# Patient Record
Sex: FEMALE | Race: WHITE | Employment: OTHER | URBAN - METROPOLITAN AREA
[De-identification: names, ages, dates, MRNs, and addresses within clinical notes are randomized per-mention and may not be internally consistent; named-entity substitution may affect disease eponyms.]

---

## 2017-05-31 ENCOUNTER — HOSPITAL ENCOUNTER (OUTPATIENT)
Dept: NUCLEAR MEDICINE | Age: 57
Discharge: HOME | End: 2017-05-31
Attending: UROLOGY
Payer: COMMERCIAL

## 2017-05-31 DIAGNOSIS — N28.9 URETERAL DISORDER: ICD-10-CM

## 2017-05-31 PROCEDURE — 78708 K FLOW/FUNCT IMAGE W/DRUG: CPT

## 2017-05-31 PROCEDURE — 74011250636 HC RX REV CODE- 250/636: Performed by: UROLOGY

## 2017-05-31 RX ORDER — FUROSEMIDE 10 MG/ML
20 INJECTION INTRAMUSCULAR; INTRAVENOUS
Status: COMPLETED | OUTPATIENT
Start: 2017-05-31 | End: 2017-05-31

## 2017-05-31 RX ORDER — SODIUM CHLORIDE 0.9 % (FLUSH) 0.9 %
SYRINGE (ML) INJECTION
Status: COMPLETED
Start: 2017-05-31 | End: 2017-05-31

## 2017-05-31 RX ADMIN — FUROSEMIDE 20 MG: 10 INJECTION, SOLUTION INTRAMUSCULAR; INTRAVENOUS at 10:28

## 2017-05-31 RX ADMIN — Medication 10 ML: at 10:25

## 2021-03-04 ENCOUNTER — APPOINTMENT (OUTPATIENT)
Dept: GENERAL RADIOLOGY | Age: 61
End: 2021-03-04
Attending: EMERGENCY MEDICINE
Payer: COMMERCIAL

## 2021-03-04 ENCOUNTER — HOSPITAL ENCOUNTER (EMERGENCY)
Age: 61
Discharge: HOME | End: 2021-03-04
Attending: EMERGENCY MEDICINE | Admitting: EMERGENCY MEDICINE
Payer: COMMERCIAL

## 2021-03-04 VITALS
HEART RATE: 64 BPM | TEMPERATURE: 98.6 F | SYSTOLIC BLOOD PRESSURE: 163 MMHG | DIASTOLIC BLOOD PRESSURE: 105 MMHG | RESPIRATION RATE: 16 BRPM | OXYGEN SATURATION: 99 %

## 2021-03-04 DIAGNOSIS — S52.502A CLOSED FRACTURE OF DISTAL END OF LEFT RADIUS, UNSPECIFIED FRACTURE MORPHOLOGY, INITIAL ENCOUNTER: Primary | ICD-10-CM

## 2021-03-04 DIAGNOSIS — S52.502A CLOSED TRAUMATIC DISPLACED FRACTURE OF DISTAL END OF LEFT RADIUS, INITIAL ENCOUNTER: ICD-10-CM

## 2021-03-04 PROCEDURE — 73110 X-RAY EXAM OF WRIST: CPT

## 2021-03-04 PROCEDURE — 74011000250 HC RX REV CODE- 250: Performed by: EMERGENCY MEDICINE

## 2021-03-04 PROCEDURE — 99283 EMERGENCY DEPT VISIT LOW MDM: CPT

## 2021-03-04 PROCEDURE — 74011250637 HC RX REV CODE- 250/637: Performed by: EMERGENCY MEDICINE

## 2021-03-04 PROCEDURE — 73100 X-RAY EXAM OF WRIST: CPT

## 2021-03-04 PROCEDURE — 75810000302 HC ER LEVEL 2 CLOSED TREATMNT FRACTURE/DISLOCATION

## 2021-03-04 RX ORDER — KETOROLAC TROMETHAMINE 10 MG/1
10 TABLET, FILM COATED ORAL
Qty: 20 TAB | Refills: 0 | Status: SHIPPED | OUTPATIENT
Start: 2021-03-04 | End: 2021-03-09

## 2021-03-04 RX ORDER — OXYCODONE AND ACETAMINOPHEN 5; 325 MG/1; MG/1
1 TABLET ORAL
Qty: 12 TAB | Refills: 0 | Status: SHIPPED | OUTPATIENT
Start: 2021-03-04 | End: 2021-03-07

## 2021-03-04 RX ORDER — LIDOCAINE HYDROCHLORIDE AND EPINEPHRINE 10; 10 MG/ML; UG/ML
1.5 INJECTION, SOLUTION INFILTRATION; PERINEURAL
Status: COMPLETED | OUTPATIENT
Start: 2021-03-04 | End: 2021-03-04

## 2021-03-04 RX ORDER — OXYCODONE AND ACETAMINOPHEN 5; 325 MG/1; MG/1
1 TABLET ORAL
Status: COMPLETED | OUTPATIENT
Start: 2021-03-04 | End: 2021-03-04

## 2021-03-04 RX ORDER — IBUPROFEN 600 MG/1
600 TABLET ORAL
Qty: 20 TAB | Refills: 0 | Status: SHIPPED | OUTPATIENT
Start: 2021-03-04

## 2021-03-04 RX ADMIN — OXYCODONE HYDROCHLORIDE AND ACETAMINOPHEN 1 TABLET: 5; 325 TABLET ORAL at 16:45

## 2021-03-04 RX ADMIN — LIDOCAINE HYDROCHLORIDE AND EPINEPHRINE 15 MG: 10; 10 INJECTION, SOLUTION INFILTRATION; PERINEURAL at 17:43
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
## 2021-03-04 NOTE — PROCEDURES
Fracture Reduction Procedural Note      Preprocedural Diagnosis: Closed traumatic displaced fracture of distal end of left radius   Postprocedural Diagnosis: Closed traumatic displaced fracture of distal end of left radius      Provider: AL Elizondo     Anesthesia: Fracture/Hematoma block    Allergy: No Known Allergies    Procedure Details: The risks,benefits and alternatives of a fracture reduction were explained and consent was obtained for the procedure. The left wrist fracture hematoma was infiltrated with 10mL of 1% lidocaine. The arm was then placed in finger traps with 5 lbs of longitudinal traction. The alignment was noted to be improved with simple traction. The block appeared to be working well at that point. Closed reduction of left wrist was then performed by re-creating the fracture then using traction and correction of the deformity. Pt. Tolerated procedure well w/o complications. Sugar-tong splint placed. Pt. And Family educated on neurovascular compromise and compartment syndrome. Pt. Neurovascularly intact with sensation intact and capillary refill <2secs p procedure in left hand. Complications:  None; patient tolerated the procedure well. Pain meds per ED team.  Post-Reduction Films Pending  F/u with Dr. Mitul Silva within 3-4 days by calling for an appointment at: [address] [place] [address]  Dr. Kimber Sanches aware and agree with above plan.            Signed By: Shyann Arreola, [address]  3/4/21  6:47pm

## 2021-09-13 ENCOUNTER — HOSPITAL ENCOUNTER (EMERGENCY)
Age: 61
Discharge: HOME | End: 2021-09-13
Attending: EMERGENCY MEDICINE | Admitting: EMERGENCY MEDICINE
Payer: COMMERCIAL

## 2021-09-13 VITALS
DIASTOLIC BLOOD PRESSURE: 75 MMHG | OXYGEN SATURATION: 100 % | TEMPERATURE: 98.3 F | RESPIRATION RATE: 18 BRPM | HEIGHT: 60 IN | SYSTOLIC BLOOD PRESSURE: 155 MMHG | BODY MASS INDEX: 20.03 KG/M2 | WEIGHT: 102 LBS | HEART RATE: 55 BPM
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
## 2021-09-13 LAB
ALBUMIN SERPL-MCNC: 3.7 G/DL (ref 3.5–5)
ALBUMIN/GLOB SERPL: 1.1 {RATIO} (ref 1.1–2.2)
ALP SERPL-CCNC: 80 U/L (ref 45–117)
ALT SERPL-CCNC: 22 U/L (ref 12–78)
ANION GAP SERPL CALC-SCNC: 4 MMOL/L (ref 5–15)
APPEARANCE UR: CLEAR
AST SERPL-CCNC: 19 U/L (ref 15–37)
BACTERIA URNS QL MICRO: NEGATIVE /HPF
BASOPHILS # BLD: 0.1 K/UL (ref 0–0.1)
BASOPHILS NFR BLD: 1 % (ref 0–1)
BILIRUB SERPL-MCNC: 0.4 MG/DL (ref 0.2–1)
BILIRUB UR QL: NEGATIVE
BUN SERPL-MCNC: 16 MG/DL (ref 6–20)
BUN/CREAT SERPL: 20 (ref 12–20)
CALCIUM SERPL-MCNC: 8.8 MG/DL (ref 8.5–10.1)
CHLORIDE SERPL-SCNC: 108 MMOL/L (ref 97–108)
CK SERPL-CCNC: 66 U/L (ref 26–192)
CO2 SERPL-SCNC: 28 MMOL/L (ref 21–32)
COLOR UR: ABNORMAL
COMMENT, HOLDF: NORMAL
CREAT SERPL-MCNC: 0.79 MG/DL (ref 0.55–1.02)
DIFFERENTIAL METHOD BLD: ABNORMAL
EOSINOPHIL # BLD: 0.1 K/UL (ref 0–0.4)
EOSINOPHIL NFR BLD: 2 % (ref 0–7)
EPITH CASTS URNS QL MICRO: ABNORMAL /LPF
ERYTHROCYTE [DISTWIDTH] IN BLOOD BY AUTOMATED COUNT: 11.5 % (ref 11.5–14.5)
FIBRINOGEN PPP-MCNC: 213 MG/DL (ref 200–475)
GLOBULIN SER CALC-MCNC: 3.4 G/DL (ref 2–4)
GLUCOSE SERPL-MCNC: 92 MG/DL (ref 65–100)
GLUCOSE UR STRIP.AUTO-MCNC: NEGATIVE MG/DL
HCT VFR BLD AUTO: 39.1 % (ref 35–47)
HGB BLD-MCNC: 13 G/DL (ref 11.5–16)
HGB UR QL STRIP: NEGATIVE
HYALINE CASTS URNS QL MICRO: ABNORMAL /LPF (ref 0–5)
IMM GRANULOCYTES # BLD AUTO: 0 K/UL (ref 0–0.04)
IMM GRANULOCYTES NFR BLD AUTO: 0 % (ref 0–0.5)
INR PPP: 1.1 (ref 0.9–1.1)
KETONES UR QL STRIP.AUTO: NEGATIVE MG/DL
LEUKOCYTE ESTERASE UR QL STRIP.AUTO: NEGATIVE
LYMPHOCYTES # BLD: 1.7 K/UL (ref 0.8–3.5)
LYMPHOCYTES NFR BLD: 33 % (ref 12–49)
MAGNESIUM SERPL-MCNC: 1.8 MG/DL (ref 1.6–2.4)
MCH RBC QN AUTO: 34 PG (ref 26–34)
MCHC RBC AUTO-ENTMCNC: 33.2 G/DL (ref 30–36.5)
MCV RBC AUTO: 102.4 FL (ref 80–99)
MONOCYTES # BLD: 0.3 K/UL (ref 0–1)
MONOCYTES NFR BLD: 6 % (ref 5–13)
NEUTS SEG # BLD: 3 K/UL (ref 1.8–8)
NEUTS SEG NFR BLD: 58 % (ref 32–75)
NITRITE UR QL STRIP.AUTO: NEGATIVE
NRBC # BLD: 0 K/UL (ref 0–0.01)
NRBC BLD-RTO: 0 PER 100 WBC
PH UR STRIP: 6.5 [PH] (ref 5–8)
PLATELET # BLD AUTO: 228 K/UL (ref 150–400)
PMV BLD AUTO: 11.4 FL (ref 8.9–12.9)
POTASSIUM SERPL-SCNC: 3.6 MMOL/L (ref 3.5–5.1)
PROT SERPL-MCNC: 7.1 G/DL (ref 6.4–8.2)
PROT UR STRIP-MCNC: ABNORMAL MG/DL
PROTHROMBIN TIME: 11 SEC (ref 9–11.1)
RBC # BLD AUTO: 3.82 M/UL (ref 3.8–5.2)
RBC #/AREA URNS HPF: ABNORMAL /HPF (ref 0–5)
SAMPLES BEING HELD,HOLD: NORMAL
SODIUM SERPL-SCNC: 140 MMOL/L (ref 136–145)
SP GR UR REFRACTOMETRY: 1.02 (ref 1–1.03)
UR CULT HOLD, URHOLD: NORMAL
UROBILINOGEN UR QL STRIP.AUTO: 1 EU/DL (ref 0.2–1)
WBC # BLD AUTO: 5.2 K/UL (ref 3.6–11)
WBC URNS QL MICRO: ABNORMAL /HPF (ref 0–4)

## 2021-09-13 PROCEDURE — 99284 EMERGENCY DEPT VISIT MOD MDM: CPT

## 2021-09-13 PROCEDURE — 93005 ELECTROCARDIOGRAM TRACING: CPT

## 2021-09-13 PROCEDURE — 80053 COMPREHEN METABOLIC PANEL: CPT

## 2021-09-13 PROCEDURE — 81001 URINALYSIS AUTO W/SCOPE: CPT

## 2021-09-13 PROCEDURE — 85610 PROTHROMBIN TIME: CPT

## 2021-09-13 PROCEDURE — 36415 COLL VENOUS BLD VENIPUNCTURE: CPT

## 2021-09-13 PROCEDURE — 90715 TDAP VACCINE 7 YRS/> IM: CPT | Performed by: EMERGENCY MEDICINE

## 2021-09-13 PROCEDURE — 90471 IMMUNIZATION ADMIN: CPT

## 2021-09-13 PROCEDURE — 99283 EMERGENCY DEPT VISIT LOW MDM: CPT

## 2021-09-13 PROCEDURE — 83735 ASSAY OF MAGNESIUM: CPT

## 2021-09-13 PROCEDURE — 85025 COMPLETE CBC W/AUTO DIFF WBC: CPT

## 2021-09-13 PROCEDURE — 85384 FIBRINOGEN ACTIVITY: CPT

## 2021-09-13 PROCEDURE — 74011250636 HC RX REV CODE- 250/636: Performed by: EMERGENCY MEDICINE

## 2021-09-13 PROCEDURE — 82550 ASSAY OF CK (CPK): CPT

## 2021-09-13 RX ORDER — BACITRACIN 500 UNIT/G
1 PACKET (EA) TOPICAL
Status: DISCONTINUED | OUTPATIENT
Start: 2021-09-13 | End: 2021-09-13 | Stop reason: HOSPADM

## 2021-09-13 RX ADMIN — TETANUS TOXOID, REDUCED DIPHTHERIA TOXOID AND ACELLULAR PERTUSSIS VACCINE, ADSORBED 0.5 ML: 5; 2.5; 8; 8; 2.5 SUSPENSION INTRAMUSCULAR at 10:19

## 2021-09-13 NOTE — ED NOTES
Bite on left lower leg has been cleaned and small amount of bacitracin applied, patient tolerated well.

## 2021-09-13 NOTE — ED NOTES
Patient discharged by MD. Results and discharge instructions reviewed with the patient by MD.  Patient verbalizes understanding. Patient discharged home, stable, ambulatory, in no acute distress. IV removed.

## 2021-09-13 NOTE — ED NOTES
Triage: Patient was walking her dog on the trails and was bit in the left ankle by snake. Possible copperhead. Two puncture bite wounds noted to left leg.

## 2021-09-13 NOTE — ED PROVIDER NOTES
79-year-old female with a history of CKD, hypertension, monoclonal gammopathy of unknown significance, thyroid disease and anxiety presents with a chief complaint of snakebite. [place] snake bit her on the left ankle. She has noticed some swelling and pain in the area but denies any other symptoms. Past Medical History:   Diagnosis Date    Chronic kidney disease     Hypertension     MGUS (monoclonal gammopathy of unknown significance) 2013    Psychiatric disorder     anxiety    Thyroid disease        Past Surgical History:   Procedure Laterality Date    HX  SECTION      HX UROLOGICAL      CYSTO    HX UROLOGICAL      cysto w/stent         Family History:   Problem Relation Age of Onset    Heart Surgery Mother     Hypertension Mother     Heart Disease Mother     Hypertension Father    [facility] Anesth Problems Neg Hx        Social History     Socioeconomic History    Marital status:      Spouse name: Not on file    Number of children: Not on file    Years of education: Not on file    Highest education level: Not on file   Occupational History    Not on file   Tobacco Use    Smoking status: Never Smoker    Smokeless tobacco: Never Used   Substance and Sexual Activity    Alcohol use: Yes     Comment: WINE 3X WEEKLY    Drug use: Not on file    Sexual activity: Not on file   Other Topics Concern    Not on file   Social History Narrative    Not on file     Social Determinants of Health     Financial Resource Strain:     Difficulty of Paying Living Expenses:    Food Insecurity:     Worried About Running Out of Food in the Last Year:     [address] in the Last Year:    Transportation Needs:     Lack of Transportation (Medical):      Lack of Transportation (Non-Medical):    Physical Activity:     Days of Exercise per Week:     Minutes of Exercise per Session:    Stress:     Feeling of Stress :    Social Connections:     Frequency of Communication with Friends and Family:     Frequency of Social Gatherings with Friends and Family:     Attends Catholic Services:     Active Member of Clubs or Organizations:     Attends Club or Organization Meetings:     Marital Status:    Intimate Partner Violence:     Fear of Current or Ex-Partner:     Emotionally Abused:     Physically Abused:     Sexually Abused: ALLERGIES: Patient has no known allergies. Review of Systems   Constitutional: Negative for fever. HENT: Negative for rhinorrhea. Respiratory: Negative for shortness of breath. Cardiovascular: Negative for chest pain. Gastrointestinal: Negative for abdominal pain. Genitourinary: Negative for dysuria. Musculoskeletal: Negative for back pain. Skin: Positive for wound. Neurological: Negative for headaches. Psychiatric/Behavioral: Negative for confusion. Vitals:    09/13/21 0938   BP: (!) 174/99   Pulse: 67   Resp: 18   Temp: 98.3 °F (36.8 °C)   SpO2: 100%   Weight: 46.3 kg (102 lb)   Height: 5' (1.524 m)            Physical Exam  Vitals and nursing note reviewed. Constitutional:       General: She is not in acute distress. Appearance: Normal appearance. She is not ill-appearing, toxic-appearing or diaphoretic. HENT:      Head: Normocephalic and atraumatic. Eyes:      Extraocular Movements: Extraocular movements intact. Cardiovascular:      Rate and Rhythm: Normal rate. Pulses: Normal pulses. Heart sounds: Normal heart sounds. Pulmonary:      Effort: Pulmonary effort is normal. No respiratory distress. Breath sounds: Normal breath sounds. Abdominal:      General: There is no distension. Musculoskeletal:         General: Normal range of motion. Cervical back: Normal range of motion. Skin:     General: Skin is dry. Comments: 2 small puncture wounds on the anterior aspect of the left ankle with local swelling and erythema. See picture below.    Neurological: Mental Status: She is alert and oriented to person, place, and time. Psychiatric:         Mood and Affect: Mood normal.                MDM  Number of Diagnoses or Management Options  Snake bite, initial encounter  Diagnosis management comments: Patient presents with a snake bite to the left ankle. She appears to have a minor local reaction. Labs were obtained and are unremarkable. Poison control was consulted and they recommended observation for 5 to 6 hours. The wound was iced and elevated. Her wound actually appears to be improving. Discussed my clinical impression(s), any labs and/or radiology results with the patient. I answered any questions and addressed any concerns. Discussed the importance of following up with their primary care physician and/or specialist(s). Discussed signs or symptoms that would warrant return back to the ER for further evaluation. The patient is agreeable with discharge.          Procedures

## 2021-09-14 LAB
ATRIAL RATE: 56 BPM
CALCULATED P AXIS, ECG09: 67 DEGREES
CALCULATED R AXIS, ECG10: 21 DEGREES
CALCULATED T AXIS, ECG11: 34 DEGREES
DIAGNOSIS, 93000: NORMAL
P-R INTERVAL, ECG05: 160 MS
Q-T INTERVAL, ECG07: 420 MS
QRS DURATION, ECG06: 70 MS
QTC CALCULATION (BEZET), ECG08: 405 MS
VENTRICULAR RATE, ECG03: 56 BPM

## 2022-03-19 PROBLEM — S52.502A CLOSED TRAUMATIC DISPLACED FRACTURE OF DISTAL END OF LEFT RADIUS: Status: ACTIVE | Noted: 2021-03-04

## 2022-07-22 ENCOUNTER — TRANSCRIBE ORDER (OUTPATIENT)
Dept: SCHEDULING | Age: 62
End: 2022-07-22

## 2022-07-22 DIAGNOSIS — M85.80 OTHER SPECIFIED DISORDERS OF BONE DENSITY AND STRUCTURE, UNSPECIFIED SITE: Primary | ICD-10-CM

## 2022-07-22 DIAGNOSIS — Z12.31 ENCOUNTER FOR SCREENING MAMMOGRAM FOR MALIGNANT NEOPLASM OF BREAST: Primary | ICD-10-CM

## 2023-03-01 ENCOUNTER — HOSPITAL ENCOUNTER (EMERGENCY)
Age: 63
Discharge: HOME | End: 2023-03-02
Attending: EMERGENCY MEDICINE
Payer: COMMERCIAL

## 2023-03-01 ENCOUNTER — APPOINTMENT (OUTPATIENT)
Dept: MRI IMAGING | Age: 63
End: 2023-03-01
Attending: NURSE PRACTITIONER
Payer: COMMERCIAL

## 2023-03-01 ENCOUNTER — APPOINTMENT (OUTPATIENT)
Dept: GENERAL RADIOLOGY | Age: 63
End: 2023-03-01
Attending: NURSE PRACTITIONER
Payer: COMMERCIAL

## 2023-03-01 DIAGNOSIS — R03.0 ELEVATED BLOOD PRESSURE READING: ICD-10-CM

## 2023-03-01 DIAGNOSIS — R10.9 RIGHT FLANK PAIN: ICD-10-CM

## 2023-03-01 DIAGNOSIS — R07.9 CHEST PAIN, UNSPECIFIED TYPE: Primary | ICD-10-CM

## 2023-03-01 DIAGNOSIS — N20.0 KIDNEY STONES: ICD-10-CM

## 2023-03-01 DIAGNOSIS — M51.37 DDD (DEGENERATIVE DISC DISEASE), LUMBOSACRAL: ICD-10-CM

## 2023-03-01 DIAGNOSIS — K80.80 BILIARY CALCULUS OF OTHER SITE WITHOUT OBSTRUCTION: ICD-10-CM

## 2023-03-01 LAB
ALBUMIN SERPL-MCNC: 4.4 G/DL (ref 3.5–5)
ALBUMIN/GLOB SERPL: 1.1 (ref 1.1–2.2)
ALP SERPL-CCNC: 78 U/L (ref 45–117)
ALT SERPL-CCNC: 23 U/L (ref 12–78)
ANION GAP SERPL CALC-SCNC: 4 MMOL/L (ref 5–15)
APPEARANCE UR: CLEAR
AST SERPL-CCNC: 25 U/L (ref 15–37)
ATRIAL RATE: 60 BPM
BACTERIA URNS QL MICRO: NEGATIVE /HPF
BASOPHILS # BLD: 0.1 K/UL (ref 0–0.1)
BASOPHILS NFR BLD: 1 % (ref 0–1)
BILIRUB SERPL-MCNC: 0.4 MG/DL (ref 0.2–1)
BILIRUB UR QL: NEGATIVE
BUN SERPL-MCNC: 22 MG/DL (ref 6–20)
BUN/CREAT SERPL: 24 (ref 12–20)
CALCIUM SERPL-MCNC: 9.7 MG/DL (ref 8.5–10.1)
CALCULATED P AXIS, ECG09: 71 DEGREES
CALCULATED R AXIS, ECG10: 42 DEGREES
CALCULATED T AXIS, ECG11: 62 DEGREES
CHLORIDE SERPL-SCNC: 106 MMOL/L (ref 97–108)
CO2 SERPL-SCNC: 30 MMOL/L (ref 21–32)
COLOR UR: ABNORMAL
COMMENT, HOLDF: NORMAL
CREAT SERPL-MCNC: 0.92 MG/DL (ref 0.55–1.02)
CRP SERPL-MCNC: <0.29 MG/DL (ref 0–0.6)
DIAGNOSIS, 93000: NORMAL
DIFFERENTIAL METHOD BLD: ABNORMAL
EOSINOPHIL # BLD: 0.2 K/UL (ref 0–0.4)
EOSINOPHIL NFR BLD: 2 % (ref 0–7)
EPITH CASTS URNS QL MICRO: ABNORMAL /LPF
ERYTHROCYTE [DISTWIDTH] IN BLOOD BY AUTOMATED COUNT: 11.9 % (ref 11.5–14.5)
ERYTHROCYTE [SEDIMENTATION RATE] IN BLOOD: 4 MM/HR (ref 0–30)
GLOBULIN SER CALC-MCNC: 3.9 G/DL (ref 2–4)
GLUCOSE SERPL-MCNC: 102 MG/DL (ref 65–100)
GLUCOSE UR STRIP.AUTO-MCNC: NEGATIVE MG/DL
HCT VFR BLD AUTO: 42.9 % (ref 35–47)
HGB BLD-MCNC: 13.9 G/DL (ref 11.5–16)
HGB UR QL STRIP: NEGATIVE
HYALINE CASTS URNS QL MICRO: ABNORMAL /LPF (ref 0–5)
IMM GRANULOCYTES # BLD AUTO: 0 K/UL (ref 0–0.04)
IMM GRANULOCYTES NFR BLD AUTO: 0 % (ref 0–0.5)
KETONES UR QL STRIP.AUTO: NEGATIVE MG/DL
LEUKOCYTE ESTERASE UR QL STRIP.AUTO: ABNORMAL
LIPASE SERPL-CCNC: 196 U/L (ref 73–393)
LYMPHOCYTES # BLD: 2.7 K/UL (ref 0.8–3.5)
LYMPHOCYTES NFR BLD: 40 % (ref 12–49)
MCH RBC QN AUTO: 33.2 PG (ref 26–34)
MCHC RBC AUTO-ENTMCNC: 32.4 G/DL (ref 30–36.5)
MCV RBC AUTO: 102.4 FL (ref 80–99)
MONOCYTES # BLD: 0.4 K/UL (ref 0–1)
MONOCYTES NFR BLD: 5 % (ref 5–13)
NEUTS SEG # BLD: 3.6 K/UL (ref 1.8–8)
NEUTS SEG NFR BLD: 52 % (ref 32–75)
NITRITE UR QL STRIP.AUTO: NEGATIVE
NRBC # BLD: 0 K/UL (ref 0–0.01)
NRBC BLD-RTO: 0 PER 100 WBC
P-R INTERVAL, ECG05: 152 MS
PH UR STRIP: 6.5 (ref 5–8)
PLATELET # BLD AUTO: 251 K/UL (ref 150–400)
PMV BLD AUTO: 11 FL (ref 8.9–12.9)
POTASSIUM SERPL-SCNC: 3.6 MMOL/L (ref 3.5–5.1)
PROCALCITONIN SERPL-MCNC: <0.05 NG/ML
PROT SERPL-MCNC: 8.3 G/DL (ref 6.4–8.2)
PROT UR STRIP-MCNC: NEGATIVE MG/DL
Q-T INTERVAL, ECG07: 420 MS
QRS DURATION, ECG06: 70 MS
QTC CALCULATION (BEZET), ECG08: 420 MS
RBC # BLD AUTO: 4.19 M/UL (ref 3.8–5.2)
RBC #/AREA URNS HPF: ABNORMAL /HPF (ref 0–5)
SAMPLES BEING HELD,HOLD: NORMAL
SODIUM SERPL-SCNC: 140 MMOL/L (ref 136–145)
SP GR UR REFRACTOMETRY: 1.02 (ref 1–1.03)
TROPONIN I SERPL HS-MCNC: 5 NG/L (ref 0–37)
UR CULT HOLD, URHOLD: NORMAL
UROBILINOGEN UR QL STRIP.AUTO: 0.2 EU/DL (ref 0.2–1)
VENTRICULAR RATE, ECG03: 60 BPM
WBC # BLD AUTO: 6.8 K/UL (ref 3.6–11)
WBC URNS QL MICRO: ABNORMAL /HPF (ref 0–4)

## 2023-03-01 PROCEDURE — 72158 MRI LUMBAR SPINE W/O & W/DYE: CPT

## 2023-03-01 PROCEDURE — 81001 URINALYSIS AUTO W/SCOPE: CPT

## 2023-03-01 PROCEDURE — 72100 X-RAY EXAM L-S SPINE 2/3 VWS: CPT

## 2023-03-01 PROCEDURE — 74011000250 HC RX REV CODE- 250: Performed by: EMERGENCY MEDICINE

## 2023-03-01 PROCEDURE — 86140 C-REACTIVE PROTEIN: CPT

## 2023-03-01 PROCEDURE — 36415 COLL VENOUS BLD VENIPUNCTURE: CPT

## 2023-03-01 PROCEDURE — 96374 THER/PROPH/DIAG INJ IV PUSH: CPT

## 2023-03-01 PROCEDURE — 93005 ELECTROCARDIOGRAM TRACING: CPT

## 2023-03-01 PROCEDURE — 96375 TX/PRO/DX INJ NEW DRUG ADDON: CPT

## 2023-03-01 PROCEDURE — 74011250636 HC RX REV CODE- 250/636: Performed by: NURSE PRACTITIONER

## 2023-03-01 PROCEDURE — 80053 COMPREHEN METABOLIC PANEL: CPT

## 2023-03-01 PROCEDURE — 85652 RBC SED RATE AUTOMATED: CPT

## 2023-03-01 PROCEDURE — 71046 X-RAY EXAM CHEST 2 VIEWS: CPT

## 2023-03-01 PROCEDURE — 84145 PROCALCITONIN (PCT): CPT

## 2023-03-01 PROCEDURE — 74011250636 HC RX REV CODE- 250/636

## 2023-03-01 PROCEDURE — A9576 INJ PROHANCE MULTIPACK: HCPCS

## 2023-03-01 PROCEDURE — 83690 ASSAY OF LIPASE: CPT

## 2023-03-01 PROCEDURE — 74011250637 HC RX REV CODE- 250/637: Performed by: NURSE PRACTITIONER

## 2023-03-01 PROCEDURE — 85025 COMPLETE CBC W/AUTO DIFF WBC: CPT

## 2023-03-01 PROCEDURE — 99285 EMERGENCY DEPT VISIT HI MDM: CPT

## 2023-03-01 PROCEDURE — 84484 ASSAY OF TROPONIN QUANT: CPT

## 2023-03-01 RX ORDER — TRAMADOL HYDROCHLORIDE 50 MG/1
50 TABLET ORAL
Qty: 8 TABLET | Refills: 0 | Status: SHIPPED | OUTPATIENT
Start: 2023-03-01 | End: 2023-03-03

## 2023-03-01 RX ORDER — SODIUM CHLORIDE 0.9 % (FLUSH) 0.9 %
10 SYRINGE (ML) INJECTION
Status: COMPLETED | OUTPATIENT
Start: 2023-03-01 | End: 2023-03-01

## 2023-03-01 RX ORDER — METHOCARBAMOL 500 MG/1
500 TABLET, FILM COATED ORAL
Qty: 12 TABLET | Refills: 0 | Status: SHIPPED | OUTPATIENT
Start: 2023-03-01

## 2023-03-01 RX ORDER — OXYCODONE AND ACETAMINOPHEN 5; 325 MG/1; MG/1
1 TABLET ORAL
Status: COMPLETED | OUTPATIENT
Start: 2023-03-01 | End: 2023-03-02

## 2023-03-01 RX ORDER — KETOROLAC TROMETHAMINE 30 MG/ML
30 INJECTION, SOLUTION INTRAMUSCULAR; INTRAVENOUS
Status: COMPLETED | OUTPATIENT
Start: 2023-03-01 | End: 2023-03-01

## 2023-03-01 RX ORDER — IBUPROFEN 600 MG/1
600 TABLET ORAL
Qty: 20 TABLET | Refills: 0 | Status: SHIPPED | OUTPATIENT
Start: 2023-03-01

## 2023-03-01 RX ORDER — CYCLOBENZAPRINE HCL 10 MG
10 TABLET ORAL
Status: COMPLETED | OUTPATIENT
Start: 2023-03-01 | End: 2023-03-01

## 2023-03-01 RX ADMIN — GADOTERIDOL 9 ML: 279.3 INJECTION, SOLUTION INTRAVENOUS at 22:04

## 2023-03-01 RX ADMIN — KETOROLAC TROMETHAMINE 30 MG: 30 INJECTION, SOLUTION INTRAMUSCULAR; INTRAVENOUS at 18:49

## 2023-03-01 RX ADMIN — SODIUM CHLORIDE, PRESERVATIVE FREE 10 ML: 5 INJECTION INTRAVENOUS at 22:04

## 2023-03-01 RX ADMIN — CYCLOBENZAPRINE 10 MG: 10 TABLET, FILM COATED ORAL at 18:49

## 2023-03-01 NOTE — Clinical Note
[address]  [address] [facility] [address]  [phone]    Work/School Note    Date: 3/1/2023    To Whom It May concern:    Mil Castillo was seen and treated today in the emergency room by the following provider(s):  Attending Provider: Ja Ramos MD  Nurse Practitioner: Kaylee Victor NP. Mil Castillo is excused from work/school on 03/01/23 and 03/02/23. She is medically clear to return to work/school on 3/3/2023.        Sincerely,          Karel Gamboa NP

## 2023-03-01 NOTE — Clinical Note
[address]  [address] [facility] [address]  [phone]    Work/School Note    Date: 3/1/2023    To Whom It May concern:    Bela Wilson was seen and treated today in the emergency room by the following provider(s):  Attending Provider: Susie Lott MD  Nurse Practitioner: Pat Duvall NP. Bela Wilson is excused from work/school on 03/02/23 and 03/03/23. She is medically clear to return to work/school on 3/4/2023.        Sincerely,          Mervat Llamas NP

## 2023-03-01 NOTE — ED TRIAGE NOTES
Pt arrives ambulatory to the ER with CC right flank nagging pain for months that worsens with turning side to side and substernal chest pain x2 days radiating to right shoulder. Pt denies SOB, N/V/D, cough/congestion. PMH: Kidney obstruction left.

## 2023-03-01 NOTE — ED PROVIDER NOTES
BELGICA Choi Nurse is a 61 y.o. female with Hx of kidney stone, HTN, MGUS, anxiety, thyroid disease who presents ambulatory to [facility] ED with cc of chest pain and flank pain. Patient reports worsening right flank pain for \"months and months. \"  She states the pain specifically worsens with movement such as \"turning in bed. \"  She states that the pain may have started after she did a vigorous [facility], but she has had no specific falls or injuries that were directly correlative with her flank pain starting. She also notes that she does have frequent falls because she is an avid mountain biker. She denies any urinary concerns, fevers, history of spinal surgeries, numbness, tingling, weakness in her extremities, urinary or fecal incontinence. Additionally, patient reports midsternal, nonexertional chest pain that is radiated to her arms associated with facial tingling over the course of the last 2 days. Patient states that the tingling also has extended into her arms at various points. No F/C, N/V/D, cough, congestion, urinary or bowel habit concerns. Denies tobacco, alcohol, substance abuse. PCP: Tyler Castillo DO    There are no other complaints, changes or physical findings at this time.     R flank pain for months- worsens w/ movement \"turning in bed\" started after bootcamp   Midsternal CP w/ arm and facial tingling for 2d     Past Medical History:   Diagnosis Date    Chronic kidney disease     Hypertension     MGUS (monoclonal gammopathy of unknown significance) 2013    Psychiatric disorder     anxiety    Thyroid disease        Past Surgical History:   Procedure Laterality Date    HX  SECTION      HX UROLOGICAL      CYSTO    HX UROLOGICAL      cysto w/stent         Family History:   Problem Relation Age of Onset    Heart Surgery Mother     Hypertension Mother     Heart Disease Mother     Hypertension Father     Anesth Problems Neg Hx        Social History Socioeconomic History    Marital status:      Spouse name: Not on file    Number of children: Not on file    Years of education: Not on file    Highest education level: Not on file   Occupational History    Not on file   Tobacco Use    Smoking status: Never    Smokeless tobacco: Never   Substance and Sexual Activity    Alcohol use: Yes     Comment: WINE 3X WEEKLY    Drug use: Not on file    Sexual activity: Not on file   Other Topics Concern    Not on file   Social History Narrative    Not on file     Social Determinants of Health     Financial Resource Strain: Not on file   Food Insecurity: Not on file   Transportation Needs: Not on file   Physical Activity: Not on file   Stress: Not on file   Social Connections: Not on file   Intimate Partner Violence: Not on file   Housing Stability: Not on file         ALLERGIES: Patient has no known allergies. Review of Systems   Constitutional:  Negative for activity change, appetite change, chills and fever. HENT:  Negative for congestion, rhinorrhea and sore throat. Eyes:  Negative for visual disturbance. Respiratory:  Negative for cough and shortness of breath. Cardiovascular:  Positive for chest pain. Gastrointestinal:  Positive for abdominal pain. Negative for diarrhea, nausea and vomiting. Genitourinary:  Positive for flank pain. Negative for dysuria, frequency and urgency. Musculoskeletal:  Positive for arthralgias, back pain and myalgias. Negative for neck pain. Skin:  Negative for color change and rash. Neurological:  Positive for numbness. Negative for dizziness, weakness, light-headedness and headaches. All other systems reviewed and are negative.     Vitals:    03/01/23 1736 03/01/23 1741 03/02/23 0055   BP: (!) 199/100 (!) 164/70 (!) 144/90   Pulse: 64 68 (!) 57   Resp: 18 16 18   Temp: 97.7 °F (36.5 °C)  97.7 °F (36.5 °C)   SpO2: 100% 100% 96%   Weight:  45.4 kg (100 lb)    Height: 5' (1.524 m) 5' (1.524 m)             Physical Exam  Vitals and nursing note reviewed. Constitutional:       General: She is not in acute distress. Appearance: She is well-developed. HENT:      Head: Normocephalic and atraumatic. Right Ear: External ear normal.      Left Ear: External ear normal.   Eyes:      Conjunctiva/sclera: Conjunctivae normal.      Pupils: Pupils are equal, round, and reactive to light. Cardiovascular:      Rate and Rhythm: Normal rate and regular rhythm. Heart sounds: Normal heart sounds. Pulmonary:      Effort: Pulmonary effort is normal.      Breath sounds: Normal breath sounds. Abdominal:      Palpations: Abdomen is soft. Tenderness: There is no abdominal tenderness. Musculoskeletal:         General: Normal range of motion. Cervical back: Normal range of motion and neck supple. Comments: TTP along R flank; no spinal midline TTP   Ambulatory w/ steady gait    Skin:     General: Skin is warm and dry. Neurological:      Mental Status: She is alert and oriented to person, place, and time. Psychiatric:         Behavior: Behavior normal.         Thought Content: Thought content normal.         Judgment: Judgment normal.        Medical Decision Making  Differentials diagnoses include PUD, indigestion, ACS, pleurisy, costochondritis, degenerative disc disease, kidney stone, cholelithiasis, cholecystitis, pancreatitis, diverticulosis, diverticulitis    Patient presents with worsening right flank pain that initially sounded like it was musculoskeletal in nature as it worsened with movement, patient states the pain has become more persistent. Initial imaging of the lumbar spine showed possible degenerative changes with questionable discitis. Radiologist had asked for MRI as well as to correlate with infectious findings. Patient has not had any fevers or flulike symptoms, or any other infectious type symptoms. Low suspicion for this, has also not had any spinal surgeries or history of IV drug use. ESR, CRP, procalcitonin and white blood cell count were all normal.  MRI was obtained with suspicion for degenerative disc disease, not likely discitis. Additionally patient reported concerned because pain was deemed to be more localized to the right flank, discussed obtaining further imaging after MRI lumbar was done. This was revealing only for gallstones, punctate kidney stones, fecal stasis. Patient did have history of hypertension noted in past medical record, had elevated blood pressure readings while in the emergency department, recommended outpatient follow-up with primary care for ongoing management of her medical needs. EKG was reviewed by an attending physician and without any emergent findings, troponin negative, lipase within normal limits low suspicion for cholecystitis or pancreatitis. Reported pain improved after medications administered. We will follow-up with primary care physician. Amount and/or Complexity of Data Reviewed  Labs: ordered. Radiology: ordered. ECG/medicine tests: ordered. Risk  Prescription drug management. ED Course as of 03/02/23 0242   Wed Mar 01, 2023   1751 EKG 1716: Rate 60, normal sinus rhythm. No ST segment or T wave abnormalities. [DK]      ED Course User Index  [DK] Troy Preciado MD       Procedures    Presentation, management, and disposition were discussed with the attending physician, Dr. Gisela Spring, who is in agreement with plan of care.     LABORATORY TESTS:  Recent Results (from the past 12 hour(s))   EKG, 12 LEAD, INITIAL    Collection Time: 03/01/23  5:16 PM   Result Value Ref Range    Ventricular Rate 60 BPM    Atrial Rate 60 BPM    P-R Interval 152 ms    QRS Duration 70 ms    Q-T Interval 420 ms    QTC Calculation (Bezet) 420 ms    Calculated P Axis 71 degrees    Calculated R Axis 42 degrees    Calculated T Axis 62 degrees    Diagnosis       Normal sinus rhythm  Possible Left atrial enlargement  When compared with ECG of 13-SEP-2021 10:29,  No significant change was found  Confirmed by Silverio Canela ([id number]) on 3/1/2023 9:24:08 PM     SAMPLES BEING HELD    Collection Time: 03/01/23  5:29 PM   Result Value Ref Range    SAMPLES BEING HELD 1RED     COMMENT        Add-on orders for these samples will be processed based on acceptable specimen integrity and analyte stability, which may vary by analyte. METABOLIC PANEL, COMPREHENSIVE    Collection Time: 03/01/23  5:29 PM   Result Value Ref Range    Sodium 140 136 - 145 mmol/L    Potassium 3.6 3.5 - 5.1 mmol/L    Chloride 106 97 - 108 mmol/L    CO2 30 21 - 32 mmol/L    Anion gap 4 (L) 5 - 15 mmol/L    Glucose 102 (H) 65 - 100 mg/dL    BUN 22 (H) 6 - 20 MG/DL    Creatinine 0.92 0.55 - 1.02 MG/DL    BUN/Creatinine ratio 24 (H) 12 - 20      eGFR >60 >60 ml/min/1.73m2    Calcium 9.7 8.5 - 10.1 MG/DL    Bilirubin, total 0.4 0.2 - 1.0 MG/DL    ALT (SGPT) 23 12 - 78 U/L    AST (SGOT) 25 15 - 37 U/L    Alk. phosphatase 78 45 - 117 U/L    Protein, total 8.3 (H) 6.4 - 8.2 g/dL    Albumin 4.4 3.5 - 5.0 g/dL    Globulin 3.9 2.0 - 4.0 g/dL    A-G Ratio 1.1 1.1 - 2.2     CBC WITH AUTOMATED DIFF    Collection Time: 03/01/23  5:29 PM   Result Value Ref Range    WBC 6.8 3.6 - 11.0 K/uL    RBC 4.19 3.80 - 5.20 M/uL    HGB 13.9 11.5 - 16.0 g/dL    HCT 42.9 35.0 - 47.0 %    .4 (H) 80.0 - 99.0 FL    MCH 33.2 26.0 - 34.0 PG    MCHC 32.4 30.0 - 36.5 g/dL    RDW 11.9 11.5 - 14.5 %    PLATELET 975 594 - 645 K/uL    MPV 11.0 8.9 - 12.9 FL    NRBC 0.0 0  WBC    ABSOLUTE NRBC 0.00 0.00 - 0.01 K/uL    NEUTROPHILS 52 32 - 75 %    LYMPHOCYTES 40 12 - 49 %    MONOCYTES 5 5 - 13 %    EOSINOPHILS 2 0 - 7 %    BASOPHILS 1 0 - 1 %    IMMATURE GRANULOCYTES 0 0.0 - 0.5 %    ABS. NEUTROPHILS 3.6 1.8 - 8.0 K/UL    ABS. LYMPHOCYTES 2.7 0.8 - 3.5 K/UL    ABS. MONOCYTES 0.4 0.0 - 1.0 K/UL    ABS. EOSINOPHILS 0.2 0.0 - 0.4 K/UL    ABS. BASOPHILS 0.1 0.0 - 0.1 K/UL    ABS. IMM.  GRANS. 0.0 0.00 - 0.04 K/UL    DF AUTOMATED TROPONIN-HIGH SENSITIVITY    Collection Time: 03/01/23  5:29 PM   Result Value Ref Range    Troponin-High Sensitivity 5 0 - 37 ng/L   LIPASE    Collection Time: 03/01/23  5:29 PM   Result Value Ref Range    Lipase 196 73 - 393 U/L   SED RATE (ESR)    Collection Time: 03/01/23  5:33 PM   Result Value Ref Range    Sed rate, automated 4 0 - 30 mm/hr   C REACTIVE PROTEIN, QT    Collection Time: 03/01/23  5:33 PM   Result Value Ref Range    C-Reactive protein <0.29 0.00 - 0.60 mg/dL   PROCALCITONIN    Collection Time: 03/01/23  5:33 PM   Result Value Ref Range    Procalcitonin <0.05 ng/mL   URINALYSIS W/MICROSCOPIC    Collection Time: 03/01/23  6:55 PM   Result Value Ref Range    Color YELLOW/STRAW      Appearance CLEAR CLEAR      Specific gravity 1.021 1.003 - 1.030      pH (UA) 6.5 5.0 - 8.0      Protein Negative NEG mg/dL    Glucose Negative NEG mg/dL    Ketone Negative NEG mg/dL    Bilirubin Negative NEG      Blood Negative NEG      Urobilinogen 0.2 0.2 - 1.0 EU/dL    Nitrites Negative NEG      Leukocyte Esterase SMALL (A) NEG      WBC 5-10 0 - 4 /hpf    RBC 0-5 0 - 5 /hpf    Epithelial cells FEW FEW /lpf    Bacteria Negative NEG /hpf    Hyaline cast 0-2 0 - 5 /lpf   URINE CULTURE HOLD SAMPLE    Collection Time: 03/01/23  6:55 PM    Specimen: Urine   Result Value Ref Range    Urine culture hold        Urine on hold in Microbiology dept for 2 days. If unpreserved urine is submitted, it cannot be used for addtional testing after 24 hours, recollection will be required. IMAGING RESULTS:  CT ABD PELV WO CONT   Final Result   No acute intraperitoneal process is identified. Residual contrast material in the renal collecting system and in the urinary   bladder from recent IV contrast administration. Cholelithiasis. Incidental and/or nonemergent findings are as described in   detail above. MRI LUMB SPINE W WO CONT         XR SPINE LUMB 2 OR 3 V   Final Result   1.  Disc height loss with subtle cortical bone loss L5-S1. Findings may indicate   discitis. Lumbar spine MRI with and without contrast is recommended as well as   laboratory values for infection      XR CHEST PA LAT   Final Result   1. No acute cardiopulmonary disease              MEDICATIONS GIVEN:  Medications   ketorolac (TORADOL) injection 30 mg (30 mg IntraVENous Given 3/1/23 1849)   cyclobenzaprine (FLEXERIL) tablet 10 mg (10 mg Oral Given 3/1/23 1849)   gadoteridoL (PROHANCE) 279.3 mg/mL contrast solution 9 mL (9 mL IntraVENous Given 3/1/23 2204)   sodium chloride (NS) flush 10 mL (10 mL IntraVENous Given 3/1/23 2204)   oxyCODONE-acetaminophen (PERCOCET) 5-325 mg per tablet 1 Tablet (1 Tablet Oral Given 3/2/23 0014)       IMPRESSION:  1. Chest pain, unspecified type    2. DDD (degenerative disc disease), lumbosacral    3. Right flank pain    4. Biliary calculus of other site without obstruction    5. Kidney stones    6. Elevated blood pressure reading        PLAN:  1. Discharge Medication List as of 3/2/2023 12:41 AM        START taking these medications    Details   methocarbamoL (ROBAXIN) 500 mg tablet Take 1 Tablet by mouth four (4) times daily as needed for Muscle Spasm(s). , Normal, Disp-12 Tablet, R-0      traMADoL (Ultram) 50 mg tablet Take 1 Tablet by mouth every six (6) hours as needed for Pain for up to 2 days. Max Daily Amount: 200 mg., Normal, Disp-8 Tablet, R-0           CONTINUE these medications which have CHANGED    Details   ibuprofen (MOTRIN) 600 mg tablet Take 1 Tablet by mouth every six (6) hours as needed for Pain., Normal, Disp-20 Tablet, R-0           CONTINUE these medications which have NOT CHANGED    Details   nefazodone (SERZONE) 150 mg tablet Take 150 mg by mouth nightly., Historical Med      traZODone (DESYREL) 50 mg tablet Take 50 mg by mouth nightly., Historical Med      solifenacin (VESICARE) 5 mg tablet Take 5 mg by mouth nightly., Historical Med           2.    Follow-up Information       Follow up With Specialties Details Why Contact Info    Diana Mccullough,  Family Medicine, Bariatrics Schedule an appointment as soon as possible for a visit       [address] [facility] Emergency Medicine Go to  As needed, If symptoms worsen [address] [facility]  [phone]    [facility]  Schedule an appointment as soon as possible for a visit  Call tomorrow for follow up [facility] [address] [facility] [id number]  [phone]          3.  Return to ED if worse

## 2023-03-02 ENCOUNTER — APPOINTMENT (OUTPATIENT)
Dept: CT IMAGING | Age: 63
End: 2023-03-02
Attending: NURSE PRACTITIONER
Payer: COMMERCIAL

## 2023-03-02 VITALS
TEMPERATURE: 97.7 F | HEART RATE: 57 BPM | RESPIRATION RATE: 18 BRPM | SYSTOLIC BLOOD PRESSURE: 144 MMHG | DIASTOLIC BLOOD PRESSURE: 90 MMHG | OXYGEN SATURATION: 96 % | BODY MASS INDEX: 19.63 KG/M2 | HEIGHT: 60 IN | WEIGHT: 100 LBS

## 2023-03-02 PROCEDURE — 74176 CT ABD & PELVIS W/O CONTRAST: CPT

## 2023-03-02 PROCEDURE — 74011250637 HC RX REV CODE- 250/637: Performed by: NURSE PRACTITIONER

## 2023-03-02 RX ADMIN — OXYCODONE AND ACETAMINOPHEN 1 TABLET: 5; 325 TABLET ORAL at 00:14

## 2023-03-02 NOTE — DISCHARGE INSTRUCTIONS
It is evident that you have degenerative disc disease on your MRI and lumbar x-ray, there is no other conclusive support that you have discitis at this time. Your white blood cell count, infectious markers including ESR, CRP and procalcitonin are all normal.  If you had worsening discitis, we would expect for all of those markers to be elevated. We recommend that you follow-up with an orthopedic specialist (spine provider) with [facility] for ongoing management-please call tomorrow to set up an appointment. Continue to avoid any exertional activities such as lifting over 10 pounds, mountain biking or any other forms of exertional exercise. You have punctate kidney stones on your CT scan, you do have gallbladder sludge and gallstones present. You can follow-up with a general surgeon regarding your gallstones. There is no evidence of gallbladder obstruction at this time. We do not have any suspicion for acute cholecystitis. Additionally, you have a mildly enlarged heart, please have your blood pressure rechecked this week. As stated your troponin was normal.  Additionally, there is some fecal stasis noted on your CT scan, use an over-the-counter laxative and or prune juice if you are not already having daily soft bowel movements. With regards to your chest pain, we did an EKG which was reviewed by an attending physician. We sent off labs screening for any signs of heart attack which came back to have. We additionally sent off labs that would indicate whether or not there was any inflammation of your gallbladder or pancreas, all of which came back normal.  We recommend that you follow-up with your family doctor for ongoing management of your chest pain concerns. Please call tomorrow morning to set up the appointment.

## 2023-03-02 NOTE — ED NOTES
Discharge instructions given to patient by MD and RN. Patient has been given counseling on medication use and verbalizes understanding. IV D/C. Pt ambulated off unit with ,  with no signs of distress.

## 2023-03-08 ENCOUNTER — OFFICE VISIT (OUTPATIENT)
Dept: SURGERY | Age: 63
End: 2023-03-08
Payer: COMMERCIAL

## 2023-03-08 VITALS
BODY MASS INDEX: 20.07 KG/M2 | SYSTOLIC BLOOD PRESSURE: 165 MMHG | TEMPERATURE: 97.9 F | HEIGHT: 60 IN | RESPIRATION RATE: 16 BRPM | DIASTOLIC BLOOD PRESSURE: 114 MMHG | OXYGEN SATURATION: 99 % | HEART RATE: 64 BPM | WEIGHT: 102.2 LBS

## 2023-03-08 DIAGNOSIS — K80.20 SYMPTOMATIC CHOLELITHIASIS: ICD-10-CM

## 2023-03-08 PROCEDURE — 99202 OFFICE O/P NEW SF 15 MIN: CPT | Performed by: SURGERY

## 2023-03-08 RX ORDER — LEVOTHYROXINE AND LIOTHYRONINE 9.5; 2.25 UG/1; UG/1
TABLET ORAL
COMMUNITY

## 2023-03-08 NOTE — Clinical Note
3/10/2023    Patient: Mar Fernández   YOB: 1960   Date of Visit: 3/8/2023     Cherri Pat DO  Via     Dear Cherri Pat DO,      Thank you for referring Ms. Mar Fernández to [facility] for evaluation. My notes for this consultation are attached. If you have questions, please do not hesitate to call me. I look forward to following your patient along with you.       Sincerely,    Sukhdev Robledo MD

## 2023-03-08 NOTE — PROGRESS NOTES
Identified pt with two pt identifiers (name and ). Reviewed chart in preparation for visit and have obtained necessary documentation. Maria Fernanda Dumont is a 61 y.o. female  Chief Complaint   Patient presents with    New Patient     Referred by [facility] ER     Abdominal Pain     Visit Vitals  BP (!) 165/114 (BP 1 Location: Left upper arm, BP Patient Position: Sitting, BP Cuff Size: Adult)   Pulse 64   Temp 97.9 °F (36.6 °C) (Oral)   Resp 16   Ht 5' (1.524 m)   Wt 102 lb 3.2 oz (46.4 kg)   SpO2 99%   BMI 19.96 kg/m²       1. Have you been to the ER, urgent care clinic since your last visit? Hospitalized since your last visit? Yes [facility] ED     2. Have you seen or consulted any other health care providers outside of the [address] [place] [address] [place] since your last visit? Include any pap smears or colon screening. Yes      Patient and provider made aware of elevated BP x2. Patient asymptomatic. Patient reminded to monitor BP, continue to take BP medications if prescribed, and follow up with PCP/Cardiologist.  Patient expressed understanding and agreement.

## 2023-03-09 ENCOUNTER — TELEPHONE (OUTPATIENT)
Dept: SURGERY | Age: 63
End: 2023-03-09

## 2023-03-09 NOTE — TELEPHONE ENCOUNTER
Returned call to patient. Two patient identifiers used. Patient stated she already spoke with Dr. Lana Navarro and stated her insurance will not approve [employer] for her surgery so she has to go to [facility] for surgery. Patient stated she believes she got everything straight and thanked for the call.

## 2023-03-10 PROBLEM — K80.20 SYMPTOMATIC CHOLELITHIASIS: Status: ACTIVE | Noted: 2023-03-10

## 2023-03-10 NOTE — PROGRESS NOTES
Surgery History and Physical    Subjective:      Tita Glez is a 61 y.o.  female who presents with long standing symptoms of gallbladder disease. Has had many episodes of right sided abdominal pain, especially after eating and sometimes radiating to her back. But they always go away fairly quickly. Recently they have been more severe. Does have known gallstones. .    Patient Active Problem List    Diagnosis Date Noted    Symptomatic cholelithiasis 03/10/2023    Closed traumatic displaced fracture of distal end of left radius 2021     Past Medical History:   Diagnosis Date    Chronic kidney disease     Hypertension     MGUS (monoclonal gammopathy of unknown significance)     Psychiatric disorder     anxiety    Symptomatic cholelithiasis 3/10/2023    Thyroid disease       Past Surgical History:   Procedure Laterality Date    HX  SECTION      HX UROLOGICAL      CYSTO    HX UROLOGICAL      cysto w/stent      Social History     Tobacco Use    Smoking status: Never    Smokeless tobacco: Never   Substance Use Topics    Alcohol use: Yes     Comment: WINE 3X WEEKLY      Family History   Problem Relation Age of Onset    Heart Surgery Mother     Hypertension Mother     Heart Disease Mother     Hypertension Father     Anesth Problems Neg Hx       Prior to Admission medications    Medication Sig Start Date End Date Taking? Authorizing Provider   LISINOPRIL, BULK,    Yes Provider, Historical   thyroid, Pork, (ARMOUR) 15 mg tablet    Yes Provider, Historical   methocarbamoL (ROBAXIN) 500 mg tablet Take 1 Tablet by mouth four (4) times daily as needed for Muscle Spasm(s). 3/1/23  Yes Joe Reynolds NP   traZODone (DESYREL) 50 mg tablet Take 50 mg by mouth nightly. Yes Provider, Historical   ibuprofen (MOTRIN) 600 mg tablet Take 1 Tablet by mouth every six (6) hours as needed for Pain.   Patient not taking: Reported on 3/8/2023 3/1/23   Joe Reynolds NP   nefazodone (SERZONE) 150 mg tablet Take 150 mg by mouth nightly. Provider, Historical   solifenacin (VESICARE) 5 mg tablet Take 5 mg by mouth nightly. Provider, Historical     No Known Allergies      Review of Systems:    A comprehensive review of systems was negative except for that written in the History of Present Illness. Objective:     @STACIA(8:)@    [id number]    Physical Exam:  General:  Alert, cooperative, no distress, appears stated age. Eyes:  Conjunctivae/corneas clear. PERRL, EOMs intact. Fundi benign   Ears:  Normal TMs and external ear canals both ears. Nose: Nares normal. Septum midline. Mucosa normal. No drainage or sinus tenderness. Mouth/Throat: Lips, mucosa, and tongue normal. Teeth and gums normal.   Neck: Supple, symmetrical, trachea midline, no adenopathy, thyroid: no enlargment/tenderness/nodules, no carotid bruit and no JVD. Back:   Symmetric, no curvature. ROM normal. No CVA tenderness. Lungs:   Clear to auscultation bilaterally. Heart:  Regular rate and rhythm, S1, S2 normal, no murmur, click, rub or gallop. Abdomen:   Soft, some tenderness in the RUQ Bowel sounds normal. No masses,  No organomegaly. Extremities: Extremities normal, atraumatic, no cyanosis or edema. Pulses: 2+ and symmetric all extremities. Skin: Skin color, texture, turgor normal. No rashes or lesions   Lymph nodes: Cervical, supraclavicular, and axillary nodes normal.   Neurologic: CNII-XII intact. Normal strength, sensation and reflexes throughout. Labs: No results found for this or any previous visit (from the past 24 hour(s)).     Data Review:  Radiology review: CT confirmed presence of gallstones, and I reviewed the films and concur     Assessment:     Symptomatic cholelithiasis      Plan:     Laparoscopic cholecystectomy

## 2023-04-23 DIAGNOSIS — Z12.31 ENCOUNTER FOR SCREENING MAMMOGRAM FOR MALIGNANT NEOPLASM OF BREAST: Primary | ICD-10-CM

## 2023-04-23 DIAGNOSIS — M85.80 OTHER SPECIFIED DISORDERS OF BONE DENSITY AND STRUCTURE, UNSPECIFIED SITE: Primary | ICD-10-CM

## 2023-04-24 DIAGNOSIS — Z12.31 ENCOUNTER FOR SCREENING MAMMOGRAM FOR MALIGNANT NEOPLASM OF BREAST: Primary | ICD-10-CM

## 2023-07-16 ENCOUNTER — HOSPITAL ENCOUNTER (EMERGENCY)
Facility: HOSPITAL | Age: 63
Discharge: HOME | End: 2023-07-16
Attending: STUDENT IN AN ORGANIZED HEALTH CARE EDUCATION/TRAINING PROGRAM
Payer: COMMERCIAL

## 2023-07-16 ENCOUNTER — APPOINTMENT (OUTPATIENT)
Facility: HOSPITAL | Age: 63
End: 2023-07-16
Payer: COMMERCIAL

## 2023-07-16 VITALS
DIASTOLIC BLOOD PRESSURE: 90 MMHG | BODY MASS INDEX: 20.03 KG/M2 | SYSTOLIC BLOOD PRESSURE: 148 MMHG | OXYGEN SATURATION: 98 % | HEART RATE: 87 BPM | WEIGHT: 102 LBS | HEIGHT: 60 IN | TEMPERATURE: 98.2 F | RESPIRATION RATE: 18 BRPM

## 2023-07-16 DIAGNOSIS — S81.811A LACERATION OF RIGHT LOWER EXTREMITY, INITIAL ENCOUNTER: Primary | ICD-10-CM

## 2023-07-16 PROCEDURE — 2500000003 HC RX 250 WO HCPCS

## 2023-07-16 PROCEDURE — 99283 EMERGENCY DEPT VISIT LOW MDM: CPT

## 2023-07-16 PROCEDURE — 73590 X-RAY EXAM OF LOWER LEG: CPT

## 2023-07-16 PROCEDURE — 12004 RPR S/N/AX/GEN/TRK7.6-12.5CM: CPT

## 2023-07-16 PROCEDURE — 6370000000 HC RX 637 (ALT 250 FOR IP)

## 2023-07-16 RX ORDER — GINSENG 100 MG
CAPSULE ORAL
Status: COMPLETED | OUTPATIENT
Start: 2023-07-16 | End: 2023-07-16

## 2023-07-16 RX ORDER — LIDOCAINE HYDROCHLORIDE AND EPINEPHRINE 10; 10 MG/ML; UG/ML
10 INJECTION, SOLUTION INFILTRATION; PERINEURAL ONCE
Status: COMPLETED | OUTPATIENT
Start: 2023-07-16 | End: 2023-07-16

## 2023-07-16 RX ORDER — CEPHALEXIN 500 MG/1
500 CAPSULE ORAL 4 TIMES DAILY
Qty: 28 CAPSULE | Refills: 0 | Status: SHIPPED | OUTPATIENT
Start: 2023-07-16 | End: 2023-07-23

## 2023-07-16 RX ORDER — OXYCODONE HYDROCHLORIDE AND ACETAMINOPHEN 5; 325 MG/1; MG/1
1 TABLET ORAL
Status: COMPLETED | OUTPATIENT
Start: 2023-07-16 | End: 2023-07-16

## 2023-07-16 RX ADMIN — Medication: at 15:14

## 2023-07-16 RX ADMIN — LIDOCAINE HYDROCHLORIDE,EPINEPHRINE BITARTRATE 10 ML: 10; .01 INJECTION, SOLUTION INFILTRATION; PERINEURAL at 15:14

## 2023-07-16 RX ADMIN — OXYCODONE HYDROCHLORIDE AND ACETAMINOPHEN 1 TABLET: 5; 325 TABLET ORAL at 15:32

## 2023-07-16 ASSESSMENT — ENCOUNTER SYMPTOMS
WHEEZING: 0
NAUSEA: 0
SORE THROAT: 0
RHINORRHEA: 0
CONSTIPATION: 0
VOMITING: 0
ABDOMINAL PAIN: 0
COUGH: 0
CHEST TIGHTNESS: 0
DIARRHEA: 0
BACK PAIN: 0
SHORTNESS OF BREATH: 0

## 2023-07-16 ASSESSMENT — PAIN DESCRIPTION - LOCATION
LOCATION: LEG
LOCATION: LEG

## 2023-07-16 ASSESSMENT — PAIN SCALES - GENERAL
PAINLEVEL_OUTOF10: 6
PAINLEVEL_OUTOF10: 8

## 2023-07-16 ASSESSMENT — PAIN DESCRIPTION - ORIENTATION
ORIENTATION: RIGHT
ORIENTATION: RIGHT;LOWER;POSTERIOR

## 2023-07-16 ASSESSMENT — PAIN - FUNCTIONAL ASSESSMENT: PAIN_FUNCTIONAL_ASSESSMENT: 0-10

## 2023-07-16 ASSESSMENT — PAIN DESCRIPTION - DESCRIPTORS
DESCRIPTORS: ACHING
DESCRIPTORS: ACHING